# Patient Record
Sex: FEMALE | Race: WHITE | Employment: UNEMPLOYED | URBAN - METROPOLITAN AREA
[De-identification: names, ages, dates, MRNs, and addresses within clinical notes are randomized per-mention and may not be internally consistent; named-entity substitution may affect disease eponyms.]

---

## 2018-08-01 ENCOUNTER — HOSPITAL ENCOUNTER (OUTPATIENT)
Dept: BEHAVIORAL HEALTH | Facility: CLINIC | Age: 48
Discharge: HOME | End: 2018-08-01
Attending: SOCIAL WORKER | Admitting: SOCIAL WORKER
Payer: MEDICAID

## 2018-08-01 VITALS — BODY MASS INDEX: 19.7 KG/M2 | HEIGHT: 68 IN | WEIGHT: 130 LBS

## 2018-08-01 PROCEDURE — H0001 ALCOHOL AND/OR DRUG ASSESS: HCPCS

## 2018-08-01 RX ORDER — IBUPROFEN 800 MG/1
800 TABLET, FILM COATED ORAL
COMMUNITY
Start: 2018-03-28

## 2018-08-01 ASSESSMENT — ANXIETY QUESTIONNAIRES
1. FEELING NERVOUS, ANXIOUS, OR ON EDGE: MORE THAN HALF THE DAYS
5. BEING SO RESTLESS THAT IT IS HARD TO SIT STILL: SEVERAL DAYS
7. FEELING AFRAID AS IF SOMETHING AWFUL MIGHT HAPPEN: MORE THAN HALF THE DAYS
2. NOT BEING ABLE TO STOP OR CONTROL WORRYING: MORE THAN HALF THE DAYS
3. WORRYING TOO MUCH ABOUT DIFFERENT THINGS: MORE THAN HALF THE DAYS
6. BECOMING EASILY ANNOYED OR IRRITABLE: SEVERAL DAYS
IF YOU CHECKED OFF ANY PROBLEMS ON THIS QUESTIONNAIRE, HOW DIFFICULT HAVE THESE PROBLEMS MADE IT FOR YOU TO DO YOUR WORK, TAKE CARE OF THINGS AT HOME, OR GET ALONG WITH OTHER PEOPLE: SOMEWHAT DIFFICULT
GAD7 TOTAL SCORE: 12
4. TROUBLE RELAXING: MORE THAN HALF THE DAYS

## 2018-08-01 ASSESSMENT — PAIN SCALES - GENERAL: PAINLEVEL: SEVERE PAIN (6)

## 2018-08-01 NOTE — PROGRESS NOTES
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     Client reports being on probation for unadulterated use of prescription medications, and violating a restraining order against her due to a domestic dispute. Client reports she unemployed and is on disability. Client reports lack of current support system. Client reports her immediate family and friends, and her main social activities did center on substance use. 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         Client Choice/Exceptions   Would you like services specific to language, age, gender, culture, [facility] preference, race, ethnicity, sexual orientation or disability?  No    What particular treatment choices and options would you like to have? Evening outpatient.    Do you have a preference for a particular treatment program? [facility] [place].    Criteria for Diagnosis     Criteria for Diagnosis  DSM-5 Criteria for Substance Use Disorder  Instructions: Determine whether the client currently meets the criteria for Substance Use Disorder using the diagnostic criteria in the DSM-V pp.481-589. Current means during the most recent 12 months outside a facility that controls access  to substances    Category of Substance Severity (ICD-10 Code / DSM 5 Code)     Alcohol Use Disorder NA   Cannabis Use Disorder Severe   (F12.20) (304.30)   Hallucinogen Use Disorder NA   Inhalant Use Disorder NA   Opioid Use Disorder Mild   (F11.10) (305.50)   Sedative, Hypnotic, or Anxiolytic Use Disorder NA   Stimulant Related Disorder Moderate   (F15.20) (304.40) Amphetamine type substance   Tobacco Use Disorder Severe   (F17.200) (305.1)    Other (or unknown) Substance Use Disorder NA       Collateral Contact Summary   Number of contacts made: 1    Contact with referring person:  Yes.    If court related records were reviewed, summarize here: NA    Information from collateral contacts supported/largely agreed with information from the client and associated risk ratings.      Rule 25 Assessment Summary and Plan   's Recommendation    Client is recommended to attend an IOP treatment program at [facility].  Client is recommended to continue with mental health appointments and referrals.      Collateral Contacts     Name:    Carl Morganmehdi   Relationship:    PO   Phone Number:      [phone]  Fax [phone] Releases:    Yes     Collateral confirmed client substance use information and associated risk ratings. He reported client intention to enter IOP treatment at [facility].      Collateral Contacts     Name:    Gigi Orourke   Relationship:    Boyfriend   Phone Number:    [phone]   Releases:    Yes     Unable to reach collateral.    ollateral Contacts      A problematic pattern of alcohol/drug use leading to clinically significant impairment or distress, as manifested by at least two of the following, occurring within a 12-month period:    Alcohol/drug is often taken in larger amounts or over a longer period than was intended.  There is a persistent desire or unsuccessful efforts to cut down or control alcohol/drug use  A great deal of time is spent in  activities necessary to obtain alcohol, use alcohol, or recover from its effects.  Continued alcohol use despite having persistent or recurrent social or interpersonal problems caused or exacerbated by the effects of alcohol/drug.  Recurrent alcohol/drug use in situations in which it is physically hazardous.  Alcohol/drug use is continued despite knowledge of having a persistent or recurrent physical or psychological problem that is likely to have been caused or exacerbated by alcohol.  Tolerance, as defined by either of the following: A need for markedly increased amounts of alcohol/drug to achieve intoxication or desired effect. and A markedly diminished effect with continued use of the same amount of alcohol/drug.      Specify if: In early remission:  After full criteria for alcohol/drug use disorder were previously met, none of the criteria for alcohol/drug use disorder have been met for at least 3 months but for less than 12 months (with the exception that Criterion A4,  Craving or a strong desire or urge to use alcohol/drug  may be met).     In sustained remission:   After full criteria for alcohol use disorder were previously met, non of the criteria for alcohol/drug use disorder have been met at any time during a period of 12 months or longer (with the exception that Criterion A4,  Craving or strong desire or urge to use alcohol/drug  may be met).   Specify if:   This additional specifier is used if the individual is in an environment where access to alcohol is restricted.    Mild: Presence of 2-3 symptoms    Moderate: Presence of 4-5 symptoms    Severe: Presence of 6 or more symptoms

## 2018-08-01 NOTE — PROGRESS NOTES
"[facility]   [address] [place] [id number]      ADULT CD ASSESSMENT ADDENDUM      Patient Name: Marian Mckeon  Cell Phone:   Home: [phone] (home)    Mobile:   Telephone Information:   Mobile [phone]       Email:  [email or web address]  Emergency Contact: Kami Walker (sister)   Tel: [phone]    ________________________________________________________________________    Adult CD Assessment Addendum updated 2018, to reflect [facility] report of client's last use date as 2018, and request for initiation of services date.         The patient reported being:  Single, in a serious relationship    With which race do you identify? White    Initial Screening Questions     1. Are you currently having severe withdrawal symptoms that are putting yourself or others in danger?  No    2. Are you currently having severe medical problems that require immediate attention?  No    3. Are you currently having severe emotional or behavioral problems that are putting yourself or others at risk of harm?  No    4. Do you have sufficient reading skills that will enable you to understand written materials, including the program rules and client rights materials?  Yes    Family History   Mother    Father      No Step-mother   NA No Step-father   NA   Maternal Grandmother    Fraternal Grandmother    Maternal Grandfather     Fraternal   Grandfather    1 Sister(s)   Living 3 Brother(s)   Living   No Half-sister(s)   NA No Half-brother(s)   NA             Who raised you? (parents, grandparents, adoptive parents, step-parents, etc.)    Mother    Have any of your family members or significant others had problems with mental illness or substance abuse?  Please explain.    Client reports her father was an \"alcoholic\".    Do you have any children or Stepchildren? No    Are you being investigated by Child Protection Services? No    Do " you have a child protection worker, probation office or ?   Yes, [facility] Domestic PO.       How would you describe your current finances?  Just making it    If you are having problems, (unpaid bills, bankruptcy, IRS problems) please explain:  No    If working or a student are you able to function appropriately in that setting? No, please explain: Client reports she is unemployed and on disability.    Describe your preferred learning style:  by reading    What personal strengths do you have that can help you get sober?  Client reports she is caring, giving, friendly, and a good mother/grandmother.    Do you currently self-administer your medications?  Yes    Have you ever had to lie to people important to you about how much you cheng?     No   Have you ever felt the need to bet more and more money?     No   Have you ever attempted treatment for a gambling problem?     No   Have you ever touched or fondled someone else inappropriately or forced them to have sex with you against their will?     No   Are you or have you ever been a registered sex offender?     No   Is there any history of sexual abuse in your family?     No   Have you ever felt obsessed by your sexual behavior, such as having sex with many partners, masturbating often, using pornography often?     No     Have you ever received therapy or stayed in the hospital for mental health problems?     No     Have you ever hurt yourself, such as cutting, burning or hitting yourself?     No     Have you ever purged, binged or restricted yourself as a way to control your weight?     No     Are you on a special diet?     No     Do you have any concerns regarding your nutritional status?     No     Have you had any appetite changes in the last 3 months?     No   Have you had weight loss or weight gain of more than 10 lbs in the last 3 months?   If patient gained or lost more than 10 lbs, then refer to program RN / attending Physician for  assessment.     No   Was the patient informed of BMI?    Below,  Referral to primary care physician     Yes   Have you engaged in any risk-taking behavior that would put you at risk for exposure to blood-borne or sexually transmitted diseases?     No   Do you have any dental problems?     Yes, Patient referred to go to their dentist.    Have you ever lived through any trauma or stressful life events?     Yes, explain: Client reports significant distress from many deaths of close persons in the past 4 years.   In the past month, have you had any of the following symptoms related to the trauma listed above? (dreams, intense memories, flashbacks, physical reactions, etc.)     No   Have you ever believed people were spying on you, or that someone was plotting against you or trying to hurt you?     No   Have you ever believed someone was reading your mind or could hear your thoughts or that you could actually read someone's mind or hear what another person was thinking?     No   Have you ever believed that someone of some force outside of yourself was putting thoughts into your mind or made you act in a way that was not your usual self?  Have you ever though you were possessed?     No   Have you ever believed you were being sent special messages through the TV, radio or newspaper?     No   Have you ever heard things other people couldn't hear, such as voices or other noises?     No   Have you ever had visions when you were awake?  Or have you ever seen things other people couldn't see?     No   Do you have a valid 's license?       Yes                     [place]-Suicide Severity Rating Scale   Suicide Ideation   1.) Have you ever wished you were dead or that you could go to sleep and not wake up?     Lifetime:  No Past Month:  No   2.) Have you actually had any thoughts of killing yourself?   Lifetime:  No Past Month:  No   3.) Have you been thinking about how you might do this?     Lifetime:  No Past Month:  No    4.) Have you had these thoughts and had some intention of acting on them?     Lifetime:  No Past Month:  No   5.) Have you started to work out the details of how to kill yourself?   Lifetime:  No Past Month:  No   6.) Do you intend to carry out this plan?      Lifetime:  No Past Month:  No   Intensity of Ideation   Intensity of ideation (1 being least severe, 5 being most severe):     Lifetime:  NA Past Month:  NA   How often do you have these thoughts?  N/A      When you have the thoughts how long do they last?  N/A   Can you stop thinking about killing yourself or wanting to die if you want to?  N/A   Are there things - anyone or anything (i.e. family, Quaker, pain of death) that stopped you from wanting to die or acting on thoughts of suicide?  Does not apply   What sort of reasons did you have for thinking about wanting to die or killing yourself (ie end pain, stop how you were feeling, get attention or reaction, revenge)?  Does not apply   Suicidal Behavior   (Suicide Attempt) - Have you made a suicide attempt?     Lifetime:  No Past Month:  No   Have you engaged in self-harm (non-suicidal self-injury)?  No   (Interrupted Attempt) - Has there been a time when you started to do something to end your life but someone or something stopped you before you actually did anything?  No   (Aborted or Self-Interrupted Attempt) - Has there been a time when you started to do something to try to end your life but you stopped yourself before you actually did anything?  No   (Preparatory Acts of Behavior) - Have you taken any steps towards making suicide attempt or preparing to kill yourself (such as collecting pills, getting a gun, giving valuables away or writing a suicide note)?  No   Actual Lethality/Medical Damage:  NA     2008  [employer]  Used with permission by Jovana Rivera, PhD.       Guide to C-SSRS Risk Ratings   NO IDEATION:  with no active thoughts IDEATION: with a wish to  "die. IDEATION: with active thoughts. Risk Ratings   If Yes No No 0 - Very Low Risk   If NA Yes No 1 - Low Risk   If NA Yes Yes 2 - Low/moderate risk   IDEATION: associated thoughts of methods without intent or plan INTENT: Intent to follow through on suicide PLAN: Plan to follow through on suicide Risk Ratings cont...   If Yes No No 3 - Moderate Risk   If Yes Yes No 4 - High Risk   If Yes Yes Yes 5 - High Risk   The patient's ADDITIONAL RISK FACTORS and lack of PROTECTIVE FACTORS may increase their overall suicide risk ratings.     Additional Risk Factors:    No additional risk factors   Protective Factors:    Having people in his/her life that would prevent the patient from considering a suicide attempt (i.e. young children, spouse, parents, etc.)     Risk Status   0. - Very Low Risk:  Evaluation Counselors:  Document in Epic / SBAR to counselor \"Very Low Risk\".      Treatment Counselors:  Reassess upon admission as applicable, assess weekly in progress notes under Dimension 3 and summarize in Discharge / Treatment summary under Dimension 3.   Additional information to support suicide risk rating: There was no addtional information to provide at this time.  Please see the above suicide risk rating information.     Mental Health Status   Physical Appearance/Attire: Appears stated age   Hygiene: well groomed   Eye Contact: at examiner   Speech Rate:  regular   Speech Volume: regular   Speech Quality: fluid   Cognitive/Perceptual:  reality based   Cognition: memory intact    Judgment: intact   Insight: intact   Orientation:  time, place, person and situation   Thought::   logical    Hallucinations:  none   General Behavioral Tone: cooperative   Psychomotor Activity: no problem noted   Gait:  no problem   Mood: appropriate   Affect: congruence/appropriate   Counselor Notes: NA       Criteria for Diagnosis: DSM-5 Criteria for Substance Use Disorders      Cannabis Use Disorder Severe - 304.30 (F12.20)  Opioid Use " Disorder Mild - 305.50 (F11.10)  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)  Tobacco Use Disorder Severe - 305.10 (F17.200)      Level of Care   I.) Intoxication and Withdrawal: 0   II.) Biomedical:  1   III.) Emotional and Behavioral:  1   IV.) Readiness to Change:  3   V.) Relapse Potential: 3   VI.) Recovery Environmental: 3       Initial Problem List     The patient is currently living in an unhealthy and/or using environment  The patient lacks relapse prevention skills  The patient has poor coping skills  The patient has poor refusal skills   The patient lacks a sober peer support network  The patient has a tendency to isolate  The patient lacks the ability to effectively manage his/her mental health issues  The patient has a significant history of grief and loss issues  The patient has current legal issues    Patient/Client is willing to follow treatment recommendations.  Yes    Counselor: Juan Lino [facility]      Vulnerable Adult Checklist for OUTPATIENTS     1.  Do you have a physical, emotional or mental infirmity or dysfunction?       No    2.  Does this issue impair your ability to provide for your own care without help, including providing yourself with food, shelter, clothing, healthcare or supervision?       No    3.  Because of this issue, I need assistance to protect myself from maltreatment by others.      No    Based on the above information:    This person is not a functional Vulnerable Adult according to [place] Statute 626.5572 subdivision 21.

## 2018-08-02 ASSESSMENT — PATIENT HEALTH QUESTIONNAIRE - PHQ9: SUM OF ALL RESPONSES TO PHQ QUESTIONS 1-9: 6

## 2018-08-02 ASSESSMENT — ANXIETY QUESTIONNAIRES: GAD7 TOTAL SCORE: 12

## 2018-08-16 NOTE — PROGRESS NOTES
Visit Date:   08/01/2018      EVALUATION COUNSELOR:  AMADA Slaughter.   DATE OF EVALUATION:  8/1/2018    PATIENT'S ADDRESS:  [address] [place] [address]   TELEPHONE:  [phone]   STATISTICS:  YOB: 1970    Age:  48 years old.   Sex:  Female.   Marital Status:  Single.   INSURANCE:  E Medicaid [place]/ Consolidated Funds.   REFERRAL SOURCE:  [facility] Probation.      REASON FOR EVALUATION:  Client reports seeking chemical dependency assessment and placement into [facility] program per referral from [facility] Domestic Supervision .  Client reports she has failed recent UAs for methamphetamine use.     *Rule 25 Assessment and Adult CD Assessment Addendum updated 8/16/2018, to reflect [facility] report of client's last use date as 8/11/2018, and request for initiation of services date, which has not occurred.     HEALTH HISTORY AND MEDICATIONS:  Client reports having a herniated disk and pinched nerve in her back with a history of back surgery in 2009.  Client reports she may need additional surgery.  Client reports having been diagnosed with high cholesterol.  Client reports needing to attend a future medical appointment to begin taking Lipitor prescription for the cholesterol.  Client reports taking ibuprofen 800 mg for back pain.  The client reports last visit with healthcare provider is in 2017.      HISTORY OF PREVIOUS TREATMENT AND COUNSELING:  Client reports 4 detox hospitalizations with the first in 1988 in [facility] with 3 additional since that time. Last detox reported 1998 in [facility].  Client reports no prior substance use treatment and minimal support group involvement 25+ years ago.  Client denies any previous mental health diagnoses and/or therapy history.  Client does report diagnosis of depression, anxiety and panic attacks from appointment/assessment 07/2018 assessment with a  mental health professional.  Client reports upcoming appointment to begin therapy 8/29/2018, and a pending medication assessment appointment 09/2018.      HISTORY OF ALCOHOL AND DRUG USE:  Client reports first age of use of alcohol age 9.  She reports abstinence from alcohol use since 2004. Occasional use pattern reported until 2014 with last date of use 4 years ago.  Client reports age of first use of marijuana age 7, with cannabis use pattern reported as: every day, 1/4 ounce per week.  Client reports first use of cocaine at age 17, with cocaine use reported as 1 time per month until 2003, with date of last use of cocaine 15 years ago.  Client reports age of first use of methamphetamines as 44.  Since 2005, client reports use pattern as: a 20 bag, every day.  Methamphetamine use in the past year is reported as: a 20 bag, 5 times per week, then down to a 20 bag, 3 days per week, with last date of use 7/29/2018.  Client reports a prescription of Vicodin at age 38 which she reports she used for 10 years.  The amount is 500-750, taken every day.  Final use pattern is reported as Vicodin 500-750, crushed and snorted until a prescription misuse charge was incurred by client, with date of last use of Vicodin reported 1 month ago.  Client reports age of first use of hallucinogens at age 11, with mushrooms tried twice, and LSD tried 5 times, with date of last use of hallucinogens 8 years ago.  Client reports age of first use of tobacco at age 4, with current use pattern as 1/2 pack per day, with last use 08/01/2018.      SUMMARY OF CHEMICAL DEPENDENCY SYMPTOMS ACKNOWLEDGED BY THE PATIENT:  Client identifies with 8 of the 11 DSM-V criteria for impression of a substance use disorder.      SUMMARY OF COLLATERAL DATA:  Information from collateral contacts supported/largely agreed with information from the client and associated risk ratings.      MENTAL STATUS ASSESSMENT:  Physical appearance and attire:  Appears stated age.   Hygiene:  Well groomed.  Eye contact:  At examiner.  Speech rate:  Regular.  Speech volume:  Regular.  Speech quality: Fluid.  Cognitive perceptual:  Reality based.  Cognition:  Memory intact.  Judgment:  Intact.  Insight:  Intact.  Orientation:  To time, place, person and situation.  Thought:  Logical.  Hallucinations:  None.  General behavioral tone:  Cooperative.  Psychomotor activity:  No problem noted.  Gait:  No problem.  Mood:  Appropriate.  Affect:  Congruent, appropriate.      VULNERABLE ADULT ASSESSMENT:  This person is not a functional vulnerable adult according to the [place] statute 626.5572, subdivision 21.      DIAGNOSTIC IMPRESSION:     F12.20, cannabis use disorder, severe.    F11.20, opioid use disorder, mild.   F15.20, amphetamine use disorder, moderate.   F17.200, tobacco use disorder, severe.      [place] [facility] PLACEMENT CRITERIA:     DIMENSION 1:  Intoxication and Withdrawal:  Risk level 0.  Client's date of last use of methamphetamine is reported as 7/29/2018. Client's last use of cannabis is reported as 7/31/2018. Client does not exhibit nor reports to experiencing signs or symptoms of withdrawal.     DIMENSION 2:  Biomedical Conditions:  Risk level 1.  Client reports of ongoing back and nerve pain. Last visit with primary 3/2017. She reports current prescriptions of Lipitor for high cholesterol. Client is able to receive services client needs.     DIMENSION 3:  Emotional and Behavioral:  Risk level 1. Client reports diagnoses of Depression, Anxiety, and Panic attacks, from 7/2018 assessment with mental health professional. Client reports upcoming beginning therapy appointment 8/29/2018, and peding medication assessment in 9/2018. Client denies previous MH diagnoses and/or therapy history. Client reports significant grief issues due to recent loss of close friends and family.     DIMENSION 4:  Readiness to Change:  Risk level 3. Client reports seeking IOP treatment, per external motivation of terms  of probation. Client's continued use demonstrates a lack of insight and tools and skills for abstinence from methamphetamine and cannabis.     DIMENSION 5:  Relapse Potential:  Risk level 3.  Client reports no prior substance use treatment and only minimal support group involvement 25 years ago. Client denies having any craving, or triggers for methamphetamine and cannabis, despite reported continued use and positive UA's. Client appears to lack education on addiction, insight into her relapse potential, and prevention skills.     DIMENSION 6:  Recovery Environment:  Risk level 3.  Client reports being on probation for unadulterated use of prescription medications, and violating a restraining order against her due to a domestic dispute. Client reports she unemployed and is on disability. Client reports lack of current support system. Client reports her immediate family and friends, and her main social activities did center on substance use. Client also identified isolative cannabis and methamphetamine use when alone.      RECOMMENDATIONS:  Client is recommended to attend the [facility] treatment program at [facility].  Client is recommended to continue with mental health appointments and referrals.       INITIAL PROBLEM LIST:  The patient is currently living in an unhealthy and/or using environment.  The patient lacks relapse prevention skills.  The patient has poor coping skills.  The patient has poor refusal skills.  The patient lacks a sober peer support network.  The patient has a tendency to isolate.  The patient lacks the ability to effectively manage her mental health issues.  The patient has a significant history of grief and/or loss issues.  The patient has current legal issues.      RATIONALE: Client reported her use has negatively impacted multiple areas of her life and she currently meets criteria for substance use disorders, occurring within a 12-month period.  Client would appear to benefit  from education about addiction along with learning sober living skills and relapse skills.         This information has been disclosed to you from records protected by Federal confidentiality rules (42 CFR part 2). The Federal rules prohibit you from making any further disclosure of this information unless further disclosure is expressly permitted by the written consent of the person to whom it pertains or as otherwise permitted by 42 CFR part 2. A general authorization for the release of medical or other information is NOT sufficient for this purpose. The Federal rules restrict any use of the information to criminally investigate or prosecute any alcohol or drug abuse patient.      MARII TALBOT [facility]             D: 2018   T: 2018   MT: HEIDY      Name:     CARMITA ANN   MRN:      [id number]        Account:      [id number]   :      1970           Visit Date:   2018      Document: [id number]

## 2018-09-13 ENCOUNTER — HOSPITAL ENCOUNTER (OUTPATIENT)
Dept: BEHAVIORAL HEALTH | Facility: CLINIC | Age: 48
End: 2018-09-13
Attending: SOCIAL WORKER
Payer: MEDICAID

## 2018-09-13 NOTE — PROGRESS NOTES
Initial Services Plan        Service Initiation Date: 9/13/2018    Immediate health and/or safety concerns: No    Identify health and safety concern(s) below and include plan to address:    None Identified    Client issues to be addressed in the first treatment sessions:     Other: being here for probation, not for self    Treatment suggestions for client during the time between intake (admit date) and completion of the individual treatment plan:     Review your patient or client handbook  Begin working on your treatment goal list      Completed by: AMADA Mary  Date completed: 9/13/2018 at 4:34 PM

## 2018-09-13 NOTE — PROGRESS NOTES
Patient:  Marian Mckeon    Date: September 13, 2018    Comprehensive Assessment UPDATE        Comprehensive Summary Update and Review  Counselor met with patient on 09/13/2018 and reviewed the Comprehensive Assessment.    The following updates have been made: Last use date changed from 07/29/2018 to 09/12/2018 for AMADA Reed    Have you ever wished you were dead or that you could go to sleep and not wake up? Past Month?  NO       Have you actually had any thoughts of killing yourself?   Past Month? NO    Have you been thinking about how you might do this?   Past Month?  No  Lifetime?   No     Have you had these thoughts and had some intention of acting on them?   Past Month?  No  Lifetime?   No     Have you started to work out the details of how to kill yourself?  Past Month?  No  Lifetime?   No     Do you intend to carry out this plan?   No     When you have the thoughts how long do they last?   N/A    Are there things - anyone or anything (ie Family, Latter day, pain of death) that stopped you from wanting to die or acting on thoughts of suicide?   Does not apply        2008  [employer]  Used with permission by Jovana Rivera, PhD.

## 2018-09-14 NOTE — PROGRESS NOTES
CD ADULT Progress Note     Treatment Plan Review completed on:  09/13/2018    Attendance Dates: 09/13/2018    Total # of Group Sessions:  1     MONDAY TUESDAY WEDNESDAY THURSDAY FRIDAY SATURDAY SUNDAY Total   Group Therapy    x    2 hours   Specialty Groups*           1:1           Family Program           [place]      x    1 hours   Phase II             Absent           Total    3 hours    3 hours     *Specialty Groups include Mental Health Care, Assertiveness and Communication, Sobriety Maintenance Skills, Spiritual Care, Stress Management, Relapse Prevention, Family Systems.                    Learning Style:  By reading    Staff member contributing:  AMADA Mary    Received supervision:  No    Client:  scheduled for next week    Did Client receive a copy of treatment plan/revised plan:  No    Changes to Treatment Plan:  No    Client agrees with plan/revised plan:  No    Any changes in Vulnerable Adult Status:  No    Substance Use Disorders:    Cannabis Use Disorder Severe - 304.30 (F12.20)  Opioid Use Disorder Mild - 305.50 (F11.10)  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)  Tobacco Use Disorder Severe - 305.10 (F17.200)    1) Care Coordination Activities:  Left message for client's PO  2) Medical, Mental Health and other appointments the client attended: None this week  3) Medication issues: None  4) Physical and mental health problems: Reports back pain   5) Any changes in Vulnerable Adult Status?  No If yes, add to treatment plan and individual abuse prevention plan.  6) Review and evaluation of the individual abuse prevention plan: Current IAPP for this program is adequate for this client        ASA Risk Ratings and Data       DIMENSION 1: Acute Intoxication/Withdrawal  The client's ability to cope with withdrawal symptoms and current state of intoxication       Acute Intoxication/Withdrawal - Current Risk Factor:  1    Reporting sober date of 09/13/2018    Goals:  TBD    Data:  Client  reports she used marijuana on 09/10/2018 and meth on 09/12/2018. Client denied withdrawal issues, but appeared fidgety and legs were jiggling. Client appeared pale and had bags under her eyes.     DIMENSION 2:  Biomedical Conditions and Complaints  The degree to which any physical disorder would interfere with treatment for substance abuse and the client's ability to tolerate any related discomfort     Biomedical Conditions and Complaints - Current Risk Factor:  1    Goals: TBD    Data: Client denied any current concerns, but did discuss her back pain with the group.     DIMENSION 3:  Emotional/Behavioral/Cognitive Conditions and Complications  The degree to which any condition or complications are likely to interfere with treatment for substance abuse or with function in significant life areas and the likelihood of risk of harm to self or others.     Emotional/Behavioral - Current Risk Factor:  1    DSM-5 Diagnoses:   Reported by client depression, anxiety and panic attack.      Suicide Assessment:  Risk Status    Ideation - Active thoughts of suicide Intent to follow through on suicide Plan for completing suicide    Yes No Yes No Yes No   Emergent  x  x  x   Urgent / Non-Emergent  x  x  x   Non- Urgent  x  x  x   No Current/Active Risk  x  x  x      Goals: TBD    Data:  Client denies thoughts of self harm. Client reports experiencing depression and anxiety symptoms. Client reports she went for a walk, read or listened to music to deal with her mental health symptoms. Client reports she also used to cope this week.     DIMENSION 4:  Readiness to Change  Consider the amount of support and encouragement necessary to keep the client involved in treatment.     Readiness to Change - Current Risk Factor:  3    Goals:  TBD    Data: Client did not report her motivation for sobriety or treatment. Client said many times she shouldn't be here and that her legal issues aren't related to using so she doesn't understand why she  needs treatment. Client appears in the precontemplation stage based on her lack of insight and behaviors.      DIMENSION 5:  Relapse/Continued Use/Continued Problem Potential  Consider the degree to which the client recognizes relapse issues and has the skills to prevent relapse of either substance use or mental health problems.     Relapse/Continued Use/Continued Problem Potential - Current Risk Factor:  3    Goals:  TBD    Data: Client reports triggers as birthdays and anniversary's of death of loved ones. Client reports cravings a 3/10 (10 greatest). Client reports she tries to keep her mind busy, walks and reading. Client did not apply sober coping skills and used this week. This is client's first time in treatment.       DIMENSION 6:  Recovery Environment  Consider the degree to which key areas of the client's life are supportive of or antagonistic to treatment participation and recovery.     Recovery Environment - Current Risk Factor:  3    Support group attended this week:  No    Did family agree to attend family week:  No    If yes:  none schedule this week    Goals:  TBD    Data:  Client denies any sober support group attendance. Client has current legals, is currently on probation. Client lacks a sober support network as she reports many of her friends and family use.          Intervention:  Therapy Group Check In, Behavioral Therapy, Cognitive Behavioral Therapy, Counselor Feedback, Education, Emotional management, Group Feedback, Motivational Enhancement Therapy, Relapse Prevention, Twelve Step Facilitation, Mental Health Education    Client listened to others present assignments. Client was welcomed into the group. Client learned about cognitive distortions.     Assessment:  Stages of Change Model  Precontemplation     Client began her first day of treatment. Client was engaged and participated, but was ambivalent about being in treatment.       Plan:    Attend Phase 1  Treatment planning scheduled for  next week  Counselor will contact PO for UA results.     AMADA Mary

## 2018-09-17 PROBLEM — F19.90 SUBSTANCE USE DISORDER: Status: ACTIVE | Noted: 2018-09-17

## 2018-09-18 ENCOUNTER — HOSPITAL ENCOUNTER (OUTPATIENT)
Dept: BEHAVIORAL HEALTH | Facility: CLINIC | Age: 48
End: 2018-09-18
Attending: SOCIAL WORKER
Payer: MEDICAID

## 2018-09-18 ENCOUNTER — BEH TREATMENT PLAN (OUTPATIENT)
Dept: BEHAVIORAL HEALTH | Facility: CLINIC | Age: 48
End: 2018-09-18
Attending: FAMILY MEDICINE

## 2018-09-18 ENCOUNTER — BEH TREATMENT PLAN (OUTPATIENT)
Dept: BEHAVIORAL HEALTH | Facility: CLINIC | Age: 48
End: 2018-09-18

## 2018-09-18 DIAGNOSIS — F19.10 SUBSTANCE ABUSE (H): ICD-10-CM

## 2018-09-18 PROCEDURE — H2035 A/D TX PROGRAM, PER HOUR: HCPCS

## 2018-09-18 PROCEDURE — H2035 A/D TX PROGRAM, PER HOUR: HCPCS | Mod: HQ

## 2018-09-18 NOTE — PROGRESS NOTES
[facility]  Adult Chemical Dependency Program  Treatment Plan Requirements    These services are provided by the facility for each patient/client according to the individual's treatment plan:    Individual and group counseling    Education    Transition services    Services to address any co-occurring mental illness    Service coordination    Initial Treatment Plan Goals:  1. Complete all the requirements of Program Orientation.  2. Maintain medication compliance throughout the program.  3. Complete requirements for workshop/skills groups based on identified issues on your problem list.  4. Complete the support group attendance feedback sheet weekly.  5. Gain family involvement in treatment process to address family issues from the problem list.  6. Attend and participate in all required groups per individual treatment plan.  7. Focus attention to individualized issues from the treatment plan.  8. Complete all requirements for UA's, alcohol screening tests and other testing.  9. Schedule a physical examination if recommended.    In addition to the above, complete all individual goals as specifically outlines on your treatment plan.    Criteria for discharge:  Patients/clients are discharged from the program following completion of the entire program including Phase I and II or acceptance of other post-treatment referrals such as alf house, or aftercare at other facilities.  Patients/clients may also be discharged for inappropriate behavior or chemical use.      Favorable Discharge - Patients/clients have completed agreed upon treatment goals, understand their diagnosis and appear motivated about the follow-up care.    Guarded Discharge - Patients/clients have demonstrated some understanding of their diagnosis and recovery process, and have completed some of their treatment goals.  This prognosis also includes patients/clients who have completed some treatment goals but have not made  commitment to community support or follow through with referrals.    Unfavorable Discharge - Patients/clients have not completed agreed upon treatment goals due to their own choice, have limited understanding of their diagnosis, and have shown minimal or inconsistent behavior conducive to recovery.  Those patients/clients discharged due to behavioral problems will also be unfavorable discharges.      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  Last use date was reported as 09/14/2018, meth and 09/12/2018 marijuana. Opiates late July/early August.     Goal:   Develop effective strategies to maintain sobriety.      Treatment Strategies:   Report to counselor and group any alcohol or drug use. 02/19/2019 MP/CC NA NA       Biomedical Conditions and Complaints     DIMENSION 2  RISK FACTOR: 1             Date Assigned Source Area of Treatment Focus / Goal / Treatment Strategies Target  Date Initials Outcome Date Completed   09/18/2018 Self -  Current, History -  Current and Assessment -  Current  Area of Treatment Focus:  Client has not had a medical evaluation in the past year.    Goal:   Obtain a medical evaluation.     Treatment Strategies:  Obtain a physical examination, advise your provider that you are in chemical dependency treatment and the extent of your chemical use.  Report  completion of exam to counselor. 02/19/2019 MP/CC Refused NA       Emotional/Behavioral/Cognitive Conditions and Complications     DIMENSION 3  RISK FACTOR: 1             Date Assigned Source Area of Treatment Focus / Goal / Treatment Strategies Target  Date Initials Outcome Date Completed     09/18/2018 Self -  Current, History -  Current and Assessment -  Current  Area of Treatment Focus:  Client lacks understanding of addiction as a disease and how this disorder affects other aspects of mental and physical health.    Goal:   Learn how to apply self-care and psychotherapy to build a repertoire of daily habits that counteract PAWS, fatigue, depression and anxiety leading to increased resiliency and well-being.     Treatment Strategies:  Attend the following 3-hour groups:    > Neurobiology of addiction: Informs client of brain changes and reduces feelings of shame, instructs on steps to help heal    > Learn and use Mindfulness to facilitate effective action in problem solving and promote health and well-being     > Mental Health Part 1: Learn 3 core processes of Acceptance,   Cognitive De-fusion, and Being Present    > Mental Health Part 2: Learn 3 core processes of Self-as-Context, Values, and Committed Action     > Learn and apply Self-Care in a variety of areas to improve mental and physical health, reduce PAWS, and increase feelings of self-empowerment, resiliency and well-being    > Learn Stress Management techniques to reduce PAWS and stress-related symptoms, increase resiliency, and learn healthy routines to replace dysfunctional habits     10/25/2018                All groups to be completed by 10/25/18 MP/CC/PAM OROZCO         SK  Effective                                           Effective       [Excused]        [Unexcused]                                           9/18/18      [9/25/18]        [10/02/18]      09/18/2018 Self -  Current, History -  Current and  "Assessment -  Current Area of Treatment Focus:  \"deal with my depression, anxiety and panic attacks\"    Goal:  Learn sober and positive sober coping skills and strategies.     Treatment Strategies:  Has first appt with psychiatry with [facility] 09/27/2018    Has first therapy appt with [facility] in 10/16/2018 02/19/2018 MP/CC NA NA        Readiness to Change     DIMENSION 4  RISK FACTOR: 4             Date Assigned Source Area of Treatment Focus / Goal / Treatment Strategies Target  Date Initials Outcome Date Completed   09/18/2018 Self -  Current, History -  Current and Assessment -  Current  Area of Treatment Focus:  Client lacks internal motivation as evidenced by continued use and being on probation. Client is in treatment currently due to probation violation of dirty UAs.     Goal:   Increase internal motivation. Learn about the difference between external motivation and internal motivation. Move up in stages of change.     Treatment Strategies:  Present using history, consequences of use and 5 values violated.    Each week write down 3 reasons you want to remain sober.     Client will read \"Motivation for Treatment\" and discuss in group.    Client will attend Stages of Change group.                         09/20/2018    1x weekly    TBD    TBD MP/CC Refused NA        Relapse/Continues Use/Continues Problem Potential     DIMENSION 5  RISK FACTOR: 4                 Date Assigned Source Area of Treatment Focus / Goal / Treatment Strategies Target  Date Initials Outcome Date Completed   09/18/2018 Self -  Current, History -  Current and Assessment -  Current  Area of Treatment Focus:   Lacks a daily routine that includes healthy and sober living skills.       Goal:    Identify personal triggers and relapse warning signs.    Treatment Strategies:    Complete the triggers and cravings assignment and include alternative behaviors.                    10/11/2018 " "GOYO/CHAIM Refused NA   09/18/2018 Self -  Current, History -  Current and Assessment -  Current Area of Treatment Focus:  Learning sober coping skills and learning how to apply them.     Goal:  Develop sober coping and living skills.    Treatment Strategies:  Develop a relapse prevention plan including lifestyle changes, recovery activities, daily structure, self-care, support systems, spirituality, work, finances, recreation and crisis management.    02/19/2018 GOYO/CC NA NA   09/18/2018 Self -  Current, History -  Current and Assessment -  Current Area of Treatment Focus:  Sober support    Goal:  Find sober support groups to attend    Treatment Strategies:  Attend a minimum of 5 sober support groups.   1   2  3  4  5   02/19/2018 MP/CC NA NA       Recovery Environment     DIMENSION 6  RISK FACTOR: 3                 Date Assigned Source Area of Treatment Focus / Goal / Treatment Strategies Target  Date Initials Outcome Date Completed   09/18/2018 Self -  Current, History -  Current and Assessment -  Current         09/18/2018 Self -  Current, History -  Current and Assessment -  Current Area of Treatment Focus:  \"work on relationship with children\"    Goal:  Children use substances too, relationships are strained due to using - wants a healthier, sober relationship with children    Treatment Strategies:  Willing to set boundaries with them if they continue to use    Wants to set a good example and be better, be sober 02/19/2018 GOYO/CC NA NA   09/18/2018 Self -  Current, History -  Current and Assessment -  Current Area of Treatment Focus:  Legal issues    Goal:  Comply with [facility] Probation    Treatment Strategies:  Follow recommendations of [facility] - all meetings and UAs 02/19/2018 MP/CC NA NA     Individual abuse prevention plan (required for lodging plus) : specific actions, referral:   No additional protection measures required other than the Program Abuse Prevention Plan - No       All interventions " that are designated as  current  will need to be completed in order to transition out of treatment with a favorable prognosis.  The treatment plan is a flexible document and a work in progress.  Interventions and goals may be added at any time to customize plan to each individual s needs.  Client may work with counselor to change interventions as long as they pertain to the goals stipulated in the plan and/or are clinically driven.     Acknowledgement of Current Treatment Plan - Initial Treatment Plan     INITIAL TREATMENT PLAN:     1. I have participated in creating my treatment plan with my therapist / counselor on __________.     I agree with the plan as it is written in the electronic health record.    Name Signature/Date   Patient     Name of Therapist / Counselor Signature/Date   Counselor/Therapist        2. I have completed and reviewed my Safety Plan with my counselor and signed this on _________. I have been given the hard copy of this plan.    Patient signature/date:      _____________________________________________________________________________    3. Last Use Date: __________    Patient signature/date:     _____________________________________________________________________________

## 2018-09-19 NOTE — PROGRESS NOTES
"Marian Mckeon  September 18, 2018  [id number]    [facility] Mental Health Process Group Note      Day and Date: Tuesday, 9/18/18     Number of participants: 3     Length of Group: 3 hours    Goal of the Group: Clients learn key concepts of traditional CBT (cognitive distortions, conditioning, automatic thoughts, reframing) and then build on these by learning three core concepts of third wave therapies (ACT, DBT, MB): Self-as-Context, Values and Committed Action. The objective is to increase psychological flexibility and choose behaviors that serve the clients  personal values.        Rating scales are 1-5, with 1 being low and 5 being high or most severe.     [place]: Group Topics and Activities     I.  D3 Intervention and Group Topic addressed: Mental Health Part 2    II. Mindfulness and/or Motivational Component:  Values and Committed Action, Psychological Flexibility, Excerpts from documentary  I AM  to emphasize the importance of community and connection to others.    III. Additional Activity:  Completed examples of  auto-  behavior vs. values based actions, completed the AAQ-II scale; identified personal value to work on this week +  create a  goal that reflects this value + commit to one step toward that goal for this week.       IV. Review of Progress:   A. Client's self-report: This was Marian's first group with this therapist.  Marian reported that the only reason she is here is because of probation requirements.  This is her first treatment for SUSHMA and her DOC is meth.  She received her evening check-in sheet (journal) with instructions and she agreed to do these daily. Marian said her biggest issue is that her grandson was put up for adoption and she is willing to have custody of him. She also said her granddaughter is in her maternal grandmother's care.  Marian said that she has had no cravings for meth but has cravings for \"weed\" constantly and said she has been smoking this since was about 8 " "years old.  Her back issues with spine and disks give her \"constant pain\" and she moved out of the area for her restricted pharmacy to get her Vicodin and now was assigned to  through [facility] and is trying to get this prescription filled. She has no history of mindfulness practice.     B. Therapist's Assessment: Marian participated fully in group and shared her personal struggles. At this time she doesn't see any consequences to her use and elaborated that \"If they told me that I could have custody of my grandson if I quit meth, I would have done it in a heartbeat\" but she said that no one gave her this option and now she discovered he was put up for adoption.  When further discussion ensued, she said she would be open minded to learning more about addiction and explore the possibility that she fits some of the criteria/experiences.  Regarding values, at first she said that none of her values were violated from her use, that she's an open book and that her sons were the ones who started her on meth. With further exploring she agreed that her family has suffered from all of their use and that \"family\" is one of her values.   The value Marian chose to work on this week is \"family\", the goal that reflects this value is \"I want to see my boys get along again and see my grandchildren again\", and the step toward this goal for this week is  \"Talk to an  about my grandson\" and ask why he was put up for adoption and/or call CPS about being able to see this child.  Marian was not engaged in the I AM documentary but participated in the rest of the group process.     V.   Reflection and evaluation of today s group experience:  Gave verbal feedback and filled out evaluation form. The most important thing learned today was  \"That I can express myself without judgement.\"     VI. Assignments or activities to do for next week: Start the evening check-in, follow-up with her first step toward \"family,\" recognize the \"observing " "self\" and notice what values are reflected in daily choices and decisions.      Therapeutic techniques in this session:  Motivational Therapy, CBT/DBT/ACT, Supportive, Behavioral Modification and Mindfulness     Additional Treatment Referrals/Follow-up Issues to Address: Not indicated at this time.      Change in Treatment Plan: Not indicated at this time. This session completes one Tx Plan intervention under D3.       Change in Diagnosis: Not at this time. Marian shared why she was here and that she was open to exploring addiction.   "

## 2018-10-11 NOTE — PROGRESS NOTES
"Visit Date:   09/18/2018      EVALUATION COUNSELOR:  AMADA Slaughter   TREATMENT COUNSELOR:  AMADA Mary     [facility]   PROGRAM:  [facility] Chemical Dependency Program in [place].     ADMISSION DATE:  09/13/2018.   LAST SESSION DATE:  9/18/2018.   DISCHARGE DATE:  10/10/2018.       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  Client is being discharged AMA.      LAST DATE OF USE: Unknown    HOURS OF TREATMENT COMPLETED:  7.      REASON FOR EVALUATION:  Client had a chemical dependency assessment on 08/01/2018 with AMADA Slaughter. Per assessment, \"client report seeking CD assessment and placement into [facility] program per referral from [facility].  Client reports she has failed recent UAs for methamphetamine use.\"      SERVICES PROVIDED:  Included treatment planning, psychoeducation, recovery skill building, relapse prevention skills, problem solving, managing conflicts, clarifying values, expressing feelings, emotional management, social skill building, 12-step facilitation and group therapy.      ISSUES ADDRESSED IN TREATMENT:  Client made a treatment plan, but did not attend any groups after making initial treatment plan, was not in treatment long enough to complete any goals.  Therefore, all risk ratings will remain the same from assessment with exception of Dimension 4 and dimension 5.   "      DIMENSION 1:  Acute Withdrawal Issues and Detoxification:  Admit risk rating 1.  Discharge risk rating 1.      DIMENSION 2:  Biomedical Conditions and/or complications:  Admit risk rating 1.  Discharge risk rating 1.      DIMENSION 3:  Emotional and Behavioral:  Admit risk rating 1.  Discharge risk rating 1       DIMENSION 4:  Readiness for Change:  Admit risk rating 3.  Discharge risk rating 4.    Client's risk rating is elevated due to her lack of attendance and follow through with IOP treatment.      DIMENSION 5:  Relapse and Continued Problem Potential:  Admit risk rating 3.  Discharge risk rating 4.    Client's risk rating is elevated due to reporting use during her short time in IOP treatment.      DIMENSION 6:  Recovery Environment:  Admit risk ratings 3.  Discharge risk rating 3.      PROGNOSIS:  This client has an unfavorable prognosis.  Client's prognosis could change if she follows all continuation of care recommendations.      STRENGTHS:  Client was polite to staff and other clients.      LIVING ARRANGEMENTS AT DISCHARGE:  Client was living with family members, unknown if that is up to date.      CONTINUATION OF CARE RECOMMENDATIONS:    Marian is to abstain from all non-prescribed mood-altering substances.    Marian is recommended to continue managing her mental and physical health with her healthcare providers as directed to do so.    Marian is to remain a law-abiding.    Marian is to follow all recommendations and conditions of her probation.    Marian is recommended to obtain an updated evaluation and follow all recommendations.         This information has been disclosed to you from records protected by Federal confidentiality rules (42 CFR part 2). The Federal rules prohibit you from making any further disclosure of this information unless further disclosure is expressly permitted by the written consent of the person to whom it pertains or as otherwise permitted by 42 CFR part 2. A general  authorization for the release of medical or other information is NOT sufficient for this purpose. The Federal rules restrict any use of the information to criminally investigate or prosecute any alcohol or drug abuse patient.      LEAH VIRAMONTES, [facility]             D: 10/10/2018   T: 10/10/2018   MT: HEIDY      Name:     CARMITA ANN   MRN:      [id number]        Account:      [id number]   :      1970           Visit Date:   2018      Document: [id number]

## 2023-06-27 NOTE — PROGRESS NOTES
"Marian Mckeon  September 18, 2018  [id number]    [facility] Mental Health Process Group Note    Day and Date: Tuesday, 8/18/18     Number of participants: 3     Length of Group: 3 hours    Goal of the Group: Clients learn key concepts of traditional CBT (cognitive distortions, conditioning, automatic thoughts, reframing) and then build on these by learning three core concepts of third wave therapies (ACT, DBT, MB): Self-as-Context, Values and Committed Action. The objective is to increase psychological flexibility and choose behaviors that serve the clients  personal values.        Rating scales are 1-5, with 1 being low and 5 being high or most severe.     [place]: Group Topics and Activities     I.  D3 Intervention and Group Topic addressed: Mental Health Part 2    II. Mindfulness and/or Motivational Component:  Values and Committed Action, Psychological Flexibility, Excerpts from documentary  I AM  to emphasize the importance of community and connection to others.    III. Additional Activity:  Completed examples of  auto-  behavior vs. values based actions, completed the AAQ-II scale; identified personal value to work on this week +  create a  goal that reflects this value + commit to one step toward that goal for this week.       IV. Review of Progress:   A. Client's self-report:     B. Therapist's Assessment:        The value   chose to work on this week is , the goal that reflects this value is , and the step toward this goal for this week is      V.   Reflection and evaluation of today s group experience:  Gave verbal feedback and filled out evaluation form. The most important thing learned today was      VI. Assignments or activities to do for next week: Start the journal, continue to do mindfulness, practice cognitive defusion, recognize the \"observing self\" and notice what values are reflected in daily choices and decisions.      Therapeutic techniques in this session:  Motivational Therapy, CBT/DBT/ACT, " Supportive, Behavioral Modification and Mindfulness     Additional Treatment Referrals/Follow-up Issues to Address: Not indicated at this time.      Change in Treatment Plan: Not indicated at this time. This session completes one Tx Plan intervention under D3.       Change in Diagnosis: Not at this time.    Follow up with your OBGYN within 2 weeks. If you don't have one you can call the number above.     No sex for 2 weeks.     You can take motrin/tylenol as needed for pain.    If you experience any new or worsening symptoms or if you are concerned you can always come back to the emergency for a re-evaluation.